# Patient Record
Sex: MALE | Race: WHITE | NOT HISPANIC OR LATINO | Employment: OTHER | URBAN - METROPOLITAN AREA
[De-identification: names, ages, dates, MRNs, and addresses within clinical notes are randomized per-mention and may not be internally consistent; named-entity substitution may affect disease eponyms.]

---

## 2018-10-02 ENCOUNTER — OFFICE VISIT (OUTPATIENT)
Dept: PODIATRY | Facility: CLINIC | Age: 68
End: 2018-10-02
Payer: MEDICARE

## 2018-10-02 VITALS — HEIGHT: 72 IN | WEIGHT: 315 LBS | BODY MASS INDEX: 42.66 KG/M2

## 2018-10-02 DIAGNOSIS — S80.812A ABRASION OF LEFT LOWER EXTREMITY, INITIAL ENCOUNTER: ICD-10-CM

## 2018-10-02 DIAGNOSIS — I73.9 PERIPHERAL VASCULAR DISEASE: ICD-10-CM

## 2018-10-02 DIAGNOSIS — R60.0 PERIPHERAL EDEMA: ICD-10-CM

## 2018-10-02 DIAGNOSIS — S90.811A ABRASION OF RIGHT FOOT EXCLUDING TOE, INITIAL ENCOUNTER: ICD-10-CM

## 2018-10-02 DIAGNOSIS — S91.209A AVULSION OF TOENAIL OF LEFT FOOT: Primary | ICD-10-CM

## 2018-10-02 DIAGNOSIS — E11.42 DIABETIC POLYNEUROPATHY ASSOCIATED WITH TYPE 2 DIABETES MELLITUS: ICD-10-CM

## 2018-10-02 DIAGNOSIS — I87.2 VENOUS INSUFFICIENCY: ICD-10-CM

## 2018-10-02 PROCEDURE — 99203 OFFICE O/P NEW LOW 30 MIN: CPT | Mod: PBBFAC,PN | Performed by: PODIATRIST

## 2018-10-02 PROCEDURE — 87070 CULTURE OTHR SPECIMN AEROBIC: CPT

## 2018-10-02 PROCEDURE — 87075 CULTR BACTERIA EXCEPT BLOOD: CPT

## 2018-10-02 PROCEDURE — 3045F PR MOST RECENT HEMOGLOBIN A1C LEVEL 7.0-9.0%: CPT | Mod: CPTII,,, | Performed by: PODIATRIST

## 2018-10-02 PROCEDURE — 99203 OFFICE O/P NEW LOW 30 MIN: CPT | Mod: S$PBB,,, | Performed by: PODIATRIST

## 2018-10-02 PROCEDURE — 1101F PT FALLS ASSESS-DOCD LE1/YR: CPT | Mod: CPTII,,, | Performed by: PODIATRIST

## 2018-10-02 PROCEDURE — 99999 PR PBB SHADOW E&M-NEW PATIENT-LVL III: CPT | Mod: PBBFAC,,, | Performed by: PODIATRIST

## 2018-10-02 NOTE — PROGRESS NOTES
Subjective:      Patient ID: Netsor Butler is a 68 y.o. male.    Chief Complaint: Nail Problem (toe nail came off left great toe)  Patient presents to clinic with the chief complaint of a nail avulsion of the Lt. Great toenail.  States this occurred yesterday following a fall with a significant amount of bleeding from the digit, prompting a visit to the ED.  Relates that hemostasis was ultimately achieved with application of a bandage and surgicel to the affected area.  Denies pain to the affected extremity with today's exam, however, he attributes this to neuropathy.  Also, relates having several additional abrasions that are also the sequela of yesterday's fall.  Is currently taking a course of Clinda that was prescribed following the ED visit.  Denies having N/V/F/C/D.  Denies any additional pedal complaints.      Past Medical History:   Diagnosis Date    Coronary artery disease     Deep vein thrombosis     Diabetes mellitus, type 2     Hyperlipidemia     Hypertension     Obesity     Osteoarthritis        Past Surgical History:   Procedure Laterality Date    APPENDECTOMY      CORONARY ARTERY BYPASS GRAFT         Family History   Problem Relation Age of Onset    Heart disease Mother     Heart disease Father        Social History     Socioeconomic History    Marital status:      Spouse name: None    Number of children: None    Years of education: None    Highest education level: None   Social Needs    Financial resource strain: None    Food insecurity - worry: None    Food insecurity - inability: None    Transportation needs - medical: None    Transportation needs - non-medical: None   Occupational History    None   Tobacco Use    Smoking status: Former Smoker     Last attempt to quit: 2005     Years since quittin.7    Smokeless tobacco: Never Used   Substance and Sexual Activity    Alcohol use: No    Drug use: No    Sexual activity: None   Other Topics Concern    None  "  Social History Narrative    None       Current Outpatient Medications   Medication Sig Dispense Refill    aspirin (ECOTRIN) 81 MG EC tablet Take 81 mg by mouth once daily.      atorvastatin (LIPITOR) 80 MG tablet Take 80 mg by mouth once daily.      canagliflozin (INVOKANA) 100 mg Tab Take 100 mg by mouth once daily.      clindamycin (CLEOCIN) 150 MG capsule Take 2 capsules (300 mg total) by mouth every 8 (eight) hours. for 7 days 42 capsule 0    docusate calcium (SURFAK) 240 mg capsule Take 240 mg by mouth 2 (two) times daily.      furosemide (LASIX) 40 MG tablet Take 80 mg by mouth 2 (two) times daily.      insulin regular 500 unit/mL Soln Inject into the skin 3 (three) times daily. Sliding scale per pump      losartan (COZAAR) 25 MG tablet Take 25 mg by mouth once daily.      multivitamin (THERAGRAN) tablet Take 1 tablet by mouth once daily.      rivaroxaban (XARELTO) 20 mg Tab Take 10 mg by mouth.      silver sulfADIAZINE 1% (SILVADENE) 1 % cream Apply topically 2 (two) times daily. To both leg ulcers 85 g 3    spironolactone (ALDACTONE) 25 MG tablet Take 25 mg by mouth 2 (two) times daily.      vitamin D 1000 units Tab Take 185 mg by mouth once daily.      nystatin (MYCOSTATIN) powder Apply topically 4 (four) times daily. 1 Bottle 1     No current facility-administered medications for this visit.        Review of patient's allergies indicates:   Allergen Reactions    Plague vaccine     Typhoid vaccines      "typhus vaccine"         Review of Systems   Constitution: Negative for chills and fever.   Cardiovascular: Positive for leg swelling.   Skin: Positive for color change and nail changes.   Musculoskeletal: Positive for joint swelling. Negative for joint pain, muscle cramps, muscle weakness and myalgias.   Neurological: Positive for numbness.   Psychiatric/Behavioral: Negative for altered mental status.           Objective:      Physical Exam   Constitutional: He is oriented to person, " place, and time. He appears well-developed and well-nourished. No distress.   Cardiovascular:   Pulses:       Dorsalis pedis pulses are 2+ on the right side, and 2+ on the left side.        Posterior tibial pulses are 1+ on the right side, and 1+ on the left side.   CFT is < 3 seconds bilateral.  Pedal hair growth is absent bilateral.  Varicosities noted bilateral.  1+ pitting edema noted to bilateral lower extremity.  Toes are warm to touch bilateral.     Musculoskeletal: He exhibits edema. He exhibits no tenderness.   Muscle strength 5/5 in all muscle groups bilateral.  No tenderness nor crepitation with ROM of foot/ankle joints bilateral.  No tenderness with palpation of bilateral foot and ankle.  Bilateral pes planus foot type.    Neurological: He is alert and oriented to person, place, and time. He has normal strength. A sensory deficit is present.   Protective sensation per [place]-Karthikeyan monofilament is absent bilateral.  Light touch is intact bilateral.   Skin: Skin is warm and dry. Capillary refill takes less than 2 seconds. Lesion noted. No abrasion, no bruising, no burn, no ecchymosis, no laceration, no petechiae and no rash noted. He is not diaphoretic. No erythema. No pallor.   Increased pedal turgor noted bilateral.  Toenails x 9 appear mycotic.  Exposed, granular nail bed noted to the Lt. Hallux.  No localized sign of infection noted.   Superficial abrasion noted to the dorsum of the Rt. Forefoot, Rt. Anterior lateral ankle, and Lt. Lateral lower leg.  Wound bases are down to dermis and granular.  Goldie wounds are edematous but devoid of erythema, fluctuance, purulence, and malodor.               Assessment:       Encounter Diagnoses   Name Primary?    Peripheral vascular disease     Diabetic polyneuropathy associated with type 2 diabetes mellitus     Venous insufficiency     Peripheral edema     Abrasion of right foot excluding toe, initial encounter     Abrasion of left lower extremity,  initial encounter     Avulsion of toenail of left foot Yes         Plan:       Nestor was seen today for nail problem.    Diagnoses and all orders for this visit:    Avulsion of toenail of left foot  -     Aerobic culture  -     Culture, Anaerobic    Peripheral vascular disease    Diabetic polyneuropathy associated with type 2 diabetes mellitus    Venous insufficiency    Peripheral edema    Abrasion of right foot excluding toe, initial encounter    Abrasion of left lower extremity, initial encounter      I counseled the patient on his conditions, their implications and medical management.    Advised to rest and elevate bilateral lower extremity to better manage fluid accumulation and to limit tension to the brittney wound to better facilitate wound healing.    Obtained cultures of the Lt. Hallux nail bed.    Advised to finish the current course of oral antibiotics until cultures have resulted.    Kaia was applied to the Lt. Hallux nail bed and all abrasion sites.   The Lt. Hallux nail bed was covered with 2x2 gauze and coban.  Instructed to keep this dressing CDI x 1 week.  Abrasion sites are to be covered with kaia and a mepilex border every three days.  Patient's wife feels competent enough to perform these dressing changes.    Advised to call if sites appear infected or in the presence of constitutional signs and symptoms.      RTC in 1 week for follow up.    Rey Torres DPM

## 2018-10-05 LAB — BACTERIA SPEC AEROBE CULT: NORMAL

## 2018-10-08 LAB — BACTERIA SPEC ANAEROBE CULT: NORMAL

## 2018-10-09 ENCOUNTER — OFFICE VISIT (OUTPATIENT)
Dept: PODIATRY | Facility: CLINIC | Age: 68
End: 2018-10-09
Payer: MEDICARE

## 2018-10-09 VITALS
DIASTOLIC BLOOD PRESSURE: 90 MMHG | HEIGHT: 71 IN | RESPIRATION RATE: 16 BRPM | HEART RATE: 81 BPM | WEIGHT: 315 LBS | BODY MASS INDEX: 44.1 KG/M2 | SYSTOLIC BLOOD PRESSURE: 196 MMHG

## 2018-10-09 DIAGNOSIS — R60.0 PERIPHERAL EDEMA: ICD-10-CM

## 2018-10-09 DIAGNOSIS — E11.42 DIABETIC POLYNEUROPATHY ASSOCIATED WITH TYPE 2 DIABETES MELLITUS: ICD-10-CM

## 2018-10-09 DIAGNOSIS — S91.209A AVULSION OF TOENAIL OF LEFT FOOT: Primary | ICD-10-CM

## 2018-10-09 DIAGNOSIS — I87.2 VENOUS INSUFFICIENCY: ICD-10-CM

## 2018-10-09 DIAGNOSIS — S80.812D ABRASION OF LEFT LOWER EXTREMITY, SUBSEQUENT ENCOUNTER: ICD-10-CM

## 2018-10-09 PROCEDURE — 3080F DIAST BP >= 90 MM HG: CPT | Mod: CPTII,,, | Performed by: PODIATRIST

## 2018-10-09 PROCEDURE — 99214 OFFICE O/P EST MOD 30 MIN: CPT | Mod: PBBFAC,PN | Performed by: PODIATRIST

## 2018-10-09 PROCEDURE — 3045F PR MOST RECENT HEMOGLOBIN A1C LEVEL 7.0-9.0%: CPT | Mod: CPTII,,, | Performed by: PODIATRIST

## 2018-10-09 PROCEDURE — 3288F FALL RISK ASSESSMENT DOCD: CPT | Mod: CPTII,,, | Performed by: PODIATRIST

## 2018-10-09 PROCEDURE — 99212 OFFICE O/P EST SF 10 MIN: CPT | Mod: S$PBB,,, | Performed by: PODIATRIST

## 2018-10-09 PROCEDURE — 1100F PTFALLS ASSESS-DOCD GE2>/YR: CPT | Mod: CPTII,,, | Performed by: PODIATRIST

## 2018-10-09 PROCEDURE — 3077F SYST BP >= 140 MM HG: CPT | Mod: CPTII,,, | Performed by: PODIATRIST

## 2018-10-09 PROCEDURE — 99999 PR PBB SHADOW E&M-EST. PATIENT-LVL IV: CPT | Mod: PBBFAC,,, | Performed by: PODIATRIST

## 2018-10-10 NOTE — PROGRESS NOTES
Subjective:      Patient ID: Nestor Butler is a 68 y.o. male.    Chief Complaint: Wound Care  Patient presents to clinic for a 1 week follow up for avulsion of the LT. Great toenail and multiple lower extremity wounds.  Denies pain to either LE with today's exam.  Has been applying kaia and a bandage to all wounds of the lower legs and has kept the prior toe football dressing clean, dry, and intact to the Lt. Hallux for one week.  Relates minimal ambulation to the affected extremity in a postoperative shoe.  Denies noticing signs of infection to the affected limbs with today's exam. Denies any additional pedal complaints.      Past Medical History:   Diagnosis Date    Coronary artery disease     Deep vein thrombosis     Diabetes mellitus, type 2     Hyperlipidemia     Hypertension     Obesity     Osteoarthritis        Past Surgical History:   Procedure Laterality Date    APPENDECTOMY      CORONARY ARTERY BYPASS GRAFT         Family History   Problem Relation Age of Onset    Heart disease Mother     Heart disease Father        Social History     Socioeconomic History    Marital status:      Spouse name: Not on file    Number of children: Not on file    Years of education: Not on file    Highest education level: Not on file   Social Needs    Financial resource strain: Not on file    Food insecurity - worry: Not on file    Food insecurity - inability: Not on file    Transportation needs - medical: Not on file    Transportation needs - non-medical: Not on file   Occupational History    Not on file   Tobacco Use    Smoking status: Former Smoker     Last attempt to quit: 2005     Years since quittin.7    Smokeless tobacco: Never Used   Substance and Sexual Activity    Alcohol use: No    Drug use: No    Sexual activity: Not on file   Other Topics Concern    Not on file   Social History Narrative    Not on file       Current Outpatient Medications   Medication Sig Dispense Refill  "   aspirin (ECOTRIN) 81 MG EC tablet Take 81 mg by mouth once daily.      atorvastatin (LIPITOR) 80 MG tablet Take 80 mg by mouth once daily.      canagliflozin (INVOKANA) 100 mg Tab Take 100 mg by mouth once daily.      docusate calcium (SURFAK) 240 mg capsule Take 240 mg by mouth 2 (two) times daily.      furosemide (LASIX) 40 MG tablet Take 80 mg by mouth 2 (two) times daily.      insulin regular 500 unit/mL Soln Inject into the skin 3 (three) times daily. Sliding scale per pump      losartan (COZAAR) 25 MG tablet Take 25 mg by mouth once daily.      multivitamin (THERAGRAN) tablet Take 1 tablet by mouth once daily.      rivaroxaban (XARELTO) 20 mg Tab Take 10 mg by mouth.      silver sulfADIAZINE 1% (SILVADENE) 1 % cream Apply topically 2 (two) times daily. To both leg ulcers 85 g 3    spironolactone (ALDACTONE) 25 MG tablet Take 25 mg by mouth 2 (two) times daily.      vitamin D 1000 units Tab Take 185 mg by mouth once daily.      nystatin (MYCOSTATIN) powder Apply topically 4 (four) times daily. 1 Bottle 1     No current facility-administered medications for this visit.        Review of patient's allergies indicates:   Allergen Reactions    Plague vaccine     Typhoid vaccines      "typhus vaccine"         Review of Systems   Constitution: Negative for chills and fever.   Cardiovascular: Positive for leg swelling.   Skin: Positive for color change and nail changes.   Musculoskeletal: Positive for joint swelling. Negative for joint pain, muscle cramps, muscle weakness and myalgias.   Neurological: Positive for numbness.   Psychiatric/Behavioral: Negative for altered mental status.           Objective:      Physical Exam   Constitutional: He is oriented to person, place, and time. He appears well-developed and well-nourished. No distress.   Cardiovascular:   Pulses:       Dorsalis pedis pulses are 2+ on the right side, and 2+ on the left side.        Posterior tibial pulses are 1+ on the right " side, and 1+ on the left side.   CFT is < 3 seconds bilateral.  Pedal hair growth is absent bilateral.  Varicosities noted bilateral.  1+ pitting edema noted to bilateral lower extremity.  Toes are warm to touch bilateral.     Musculoskeletal: He exhibits edema. He exhibits no tenderness.   Muscle strength 5/5 in all muscle groups bilateral.  No tenderness nor crepitation with ROM of foot/ankle joints bilateral.  No tenderness with palpation of bilateral foot and ankle.  Bilateral pes planus foot type.    Neurological: He is alert and oriented to person, place, and time. He has normal strength. A sensory deficit is present.   Protective sensation per [place]-Karthikeyan monofilament is absent bilateral.  Light touch is intact bilateral.   Skin: Skin is warm and dry. Capillary refill takes less than 2 seconds. Lesion noted. No abrasion, no bruising, no burn, no ecchymosis, no laceration, no petechiae and no rash noted. He is not diaphoretic. No erythema. No pallor.   Increased pedal turgor noted bilateral.  Toenails x 9 appear mycotic.  Exposed, granular nail bed noted to the Lt. Hallux interspersed with areas of fragile epithelium.  No localized sign of infection noted.   Superficial abrasion noted to the Lt. Lateral lower leg.  Wound base is down to dermis and granular.  Goldie wound appears edematous but devoid of erythema, fluctuance, purulence, and malodor.   Epithelialization of former wounds of the Rt. Lower leg and foot.             Assessment:       Encounter Diagnoses   Name Primary?    Avulsion of toenail of left foot Yes    Abrasion of left lower extremity, subsequent encounter     Venous insufficiency     Peripheral edema     Diabetic polyneuropathy associated with type 2 diabetes mellitus          Plan:       Nestor was seen today for wound care.    Diagnoses and all orders for this visit:    Avulsion of toenail of left foot    Abrasion of left lower extremity, subsequent encounter    Venous  insufficiency    Peripheral edema    Diabetic polyneuropathy associated with type 2 diabetes mellitus      I counseled the patient on his conditions, their implications and medical management.    Advised to rest and elevate bilateral lower extremity to better manage fluid accumulation and to limit tension to the brittney wound to better facilitate wound healing.    Saida was applied to the Lt. Hallux nail bed and the Lt. Lower leg abrasion.  Both sites are to be covered with saida and a mepilex border every three days.     Advised to call if sites appear infected or in the presence of constitutional signs and symptoms.  Also, to call if the Lt. Nail bed wound has failed to fully epithelialize in two weeks.    RTC in 3 months for routine diabetic follow up.    Rey Torres DPM

## 2019-01-04 ENCOUNTER — OFFICE VISIT (OUTPATIENT)
Dept: UROLOGY | Facility: CLINIC | Age: 69
End: 2019-01-04
Payer: MEDICARE

## 2019-01-04 VITALS
WEIGHT: 315 LBS | SYSTOLIC BLOOD PRESSURE: 141 MMHG | HEART RATE: 75 BPM | BODY MASS INDEX: 44.1 KG/M2 | DIASTOLIC BLOOD PRESSURE: 78 MMHG | HEIGHT: 71 IN

## 2019-01-04 DIAGNOSIS — R35.0 INCREASED URINARY FREQUENCY: Primary | ICD-10-CM

## 2019-01-04 DIAGNOSIS — N48.83 ACQUIRED BURIED PENIS: ICD-10-CM

## 2019-01-04 DIAGNOSIS — N39.41 URGE INCONTINENCE: ICD-10-CM

## 2019-01-04 DIAGNOSIS — N47.1 ACQUIRED PHIMOSIS: ICD-10-CM

## 2019-01-04 DIAGNOSIS — R39.15 URINARY URGENCY: ICD-10-CM

## 2019-01-04 LAB
BILIRUB SERPL-MCNC: ABNORMAL MG/DL
BLOOD URINE, POC: ABNORMAL
COLOR, POC UA: YELLOW
GLUCOSE UR QL STRIP: 500
KETONES UR QL STRIP: ABNORMAL
LEUKOCYTE ESTERASE URINE, POC: ABNORMAL
NITRITE, POC UA: ABNORMAL
PH, POC UA: 5.5
PROTEIN, POC: 30
SPECIFIC GRAVITY, POC UA: 1.01
UROBILINOGEN, POC UA: ABNORMAL

## 2019-01-04 PROCEDURE — 51701 PR INSERTION OF NON-INDWELLING BLADDER CATHETERIZATION FOR RESIDUAL UR: ICD-10-PCS | Mod: S$GLB,,, | Performed by: UROLOGY

## 2019-01-04 PROCEDURE — 99999 PR PBB SHADOW E&M-EST. PATIENT-LVL III: ICD-10-PCS | Mod: PBBFAC,,, | Performed by: UROLOGY

## 2019-01-04 PROCEDURE — 87086 URINE CULTURE/COLONY COUNT: CPT

## 2019-01-04 PROCEDURE — 81002 URINALYSIS NONAUTO W/O SCOPE: CPT | Mod: S$GLB,,, | Performed by: UROLOGY

## 2019-01-04 PROCEDURE — 87077 CULTURE AEROBIC IDENTIFY: CPT

## 2019-01-04 PROCEDURE — 81002 POCT URINE DIPSTICK WITHOUT MICROSCOPE: ICD-10-PCS | Mod: S$GLB,,, | Performed by: UROLOGY

## 2019-01-04 PROCEDURE — 99204 OFFICE O/P NEW MOD 45 MIN: CPT | Mod: 25,S$GLB,, | Performed by: UROLOGY

## 2019-01-04 PROCEDURE — 3077F PR MOST RECENT SYSTOLIC BLOOD PRESSURE >= 140 MM HG: ICD-10-PCS | Mod: CPTII,S$GLB,, | Performed by: UROLOGY

## 2019-01-04 PROCEDURE — 3077F SYST BP >= 140 MM HG: CPT | Mod: CPTII,S$GLB,, | Performed by: UROLOGY

## 2019-01-04 PROCEDURE — 1101F PR PT FALLS ASSESS DOC 0-1 FALLS W/OUT INJ PAST YR: ICD-10-PCS | Mod: CPTII,S$GLB,, | Performed by: UROLOGY

## 2019-01-04 PROCEDURE — 99499 RISK ADDL DX/OHS AUDIT: ICD-10-PCS | Mod: S$GLB,,, | Performed by: UROLOGY

## 2019-01-04 PROCEDURE — 99999 PR PBB SHADOW E&M-EST. PATIENT-LVL III: CPT | Mod: PBBFAC,,, | Performed by: UROLOGY

## 2019-01-04 PROCEDURE — 51701 INSERT BLADDER CATHETER: CPT | Mod: S$GLB,,, | Performed by: UROLOGY

## 2019-01-04 PROCEDURE — 3078F PR MOST RECENT DIASTOLIC BLOOD PRESSURE < 80 MM HG: ICD-10-PCS | Mod: CPTII,S$GLB,, | Performed by: UROLOGY

## 2019-01-04 PROCEDURE — 1101F PT FALLS ASSESS-DOCD LE1/YR: CPT | Mod: CPTII,S$GLB,, | Performed by: UROLOGY

## 2019-01-04 PROCEDURE — 99499 UNLISTED E&M SERVICE: CPT | Mod: S$GLB,,, | Performed by: UROLOGY

## 2019-01-04 PROCEDURE — 87088 URINE BACTERIA CULTURE: CPT

## 2019-01-04 PROCEDURE — 87186 SC STD MICRODIL/AGAR DIL: CPT

## 2019-01-04 PROCEDURE — 3078F DIAST BP <80 MM HG: CPT | Mod: CPTII,S$GLB,, | Performed by: UROLOGY

## 2019-01-04 PROCEDURE — 99204 PR OFFICE/OUTPT VISIT, NEW, LEVL IV, 45-59 MIN: ICD-10-PCS | Mod: 25,S$GLB,, | Performed by: UROLOGY

## 2019-01-04 RX ORDER — OXYBUTYNIN CHLORIDE 5 MG/1
5 TABLET, EXTENDED RELEASE ORAL DAILY
Qty: 30 TABLET | Refills: 11 | Status: SHIPPED | OUTPATIENT
Start: 2019-01-04 | End: 2020-01-04

## 2019-01-04 RX ORDER — CIPROFLOXACIN 500 MG/1
500 TABLET ORAL 2 TIMES DAILY
Qty: 20 TABLET | Refills: 0 | Status: SHIPPED | OUTPATIENT
Start: 2019-01-04 | End: 2019-01-14

## 2019-01-04 NOTE — PROGRESS NOTES
Patient, Nestor Butler (MRN #[id number]), presented with a recorded BMI of 56.63 kg/m^2 consistent with the definition of morbid obesity (ICD-10 E66.01). The patient's morbid obesity was monitored, evaluated, addressed and/or treated. This addendum to the medical record is made on 01/04/2019.

## 2019-01-04 NOTE — PROGRESS NOTES
Subjective:       Patient ID: Nestor Butler is a 68 y.o. male.    Chief Complaint: Consult (urinary incontience)    HPI     68 year old morbidly obese male with BMI >56 complains of urinary frequency and urgency with urge incontinence and hesitancy.   He says his flow is ok.  He has mild dysuria.  No gross hematuria.   Seen in wheelchair and has difficulty transferring to low exam table.  He takes Aldactone and Lasix.   He takes no BPH medication.    Cath UA:  Approx 200 ml.    Urine dipstick shows positive for 1+blood and positive for trace leukocytes.  Micro exam: 30-50 WBC's per HPF and 2+ bacteria.    Past Medical History:   Diagnosis Date    Coronary artery disease     Deep vein thrombosis     Diabetes mellitus, type 2     Hyperlipidemia     Hypertension     Obesity     Osteoarthritis      Past Surgical History:   Procedure Laterality Date    APPENDECTOMY      CORONARY ARTERY BYPASS GRAFT           Current Outpatient Medications:     aspirin (ECOTRIN) 81 MG EC tablet, Take 81 mg by mouth once daily., Disp: , Rfl:     atorvastatin (LIPITOR) 80 MG tablet, Take 80 mg by mouth once daily., Disp: , Rfl:     canagliflozin (INVOKANA) 100 mg Tab, Take 100 mg by mouth once daily., Disp: , Rfl:     docusate calcium (SURFAK) 240 mg capsule, Take 240 mg by mouth 2 (two) times daily., Disp: , Rfl:     furosemide (LASIX) 40 MG tablet, Take 80 mg by mouth 2 (two) times daily., Disp: , Rfl:     insulin regular 500 unit/mL Soln, Inject into the skin 3 (three) times daily. Sliding scale per pump, Disp: , Rfl:     losartan (COZAAR) 25 MG tablet, Take 25 mg by mouth once daily., Disp: , Rfl:     multivitamin (THERAGRAN) tablet, Take 1 tablet by mouth once daily., Disp: , Rfl:     rivaroxaban (XARELTO) 20 mg Tab, Take 10 mg by mouth., Disp: , Rfl:     silver sulfADIAZINE 1% (SILVADENE) 1 % cream, Apply topically 2 (two) times daily. To both leg ulcers, Disp: 85 g, Rfl: 3    spironolactone (ALDACTONE) 25 MG tablet,  Take 25 mg by mouth 2 (two) times daily., Disp: , Rfl:     vitamin D 1000 units Tab, Take 185 mg by mouth once daily., Disp: , Rfl:     ciprofloxacin HCl (CIPRO) 500 MG tablet, Take 1 tablet (500 mg total) by mouth 2 (two) times daily. for 10 days, Disp: 20 tablet, Rfl: 0    nystatin (MYCOSTATIN) powder, Apply topically 4 (four) times daily., Disp: 1 Bottle, Rfl: 1    oxybutynin (DITROPAN-XL) 5 MG TR24, Take 1 tablet (5 mg total) by mouth once daily., Disp: 30 tablet, Rfl: 11      Review of Systems   Constitutional: Negative for fever.   Eyes: Negative for visual disturbance.   Respiratory: Negative for shortness of breath.    Cardiovascular: Negative for chest pain.   Gastrointestinal: Negative for nausea.   Genitourinary: Positive for dysuria, frequency and urgency. Negative for hematuria.   Musculoskeletal: Positive for gait problem.   Skin: Negative for rash.   Neurological: Negative for seizures.   Psychiatric/Behavioral: Negative for confusion.       Objective:      Physical Exam   Constitutional: He is oriented to person, place, and time. He appears well-developed and well-nourished.   HENT:   Head: Normocephalic and atraumatic.   Eyes: Conjunctivae are normal.   Cardiovascular: Normal rate.   Pulmonary/Chest: Effort normal.   Abdominal: Soft. He exhibits no distension. There is no tenderness.   Genitourinary:   Genitourinary Comments: Penis buried with phimosis.  Testes normal   Musculoskeletal: Normal range of motion. He exhibits edema.   Neurological: He is alert and oriented to person, place, and time.   Skin: Skin is warm and dry. No rash noted.   Psychiatric: He has a normal mood and affect.   Vitals reviewed.        Cath UA (200 ml)    Assessment:       1. Increased urinary frequency    2. Urinary urgency    3. Urge incontinence    4. Acquired phimosis    5. Acquired buried penis        Plan:       Increased urinary frequency  -     POCT urine dipstick without microscope  -     Urine  culture    Urinary urgency  -     POCT urine dipstick without microscope    Urge incontinence  -     POCT urine dipstick without microscope    Acquired phimosis  -     POCT urine dipstick without microscope    Acquired buried penis  -     POCT urine dipstick without microscope    Other orders  -     ciprofloxacin HCl (CIPRO) 500 MG tablet; Take 1 tablet (500 mg total) by mouth 2 (two) times daily. for 10 days  Dispense: 20 tablet; Refill: 0  -     oxybutynin (DITROPAN-XL) 5 MG TR24; Take 1 tablet (5 mg total) by mouth once daily.  Dispense: 30 tablet; Refill: 11

## 2019-01-07 ENCOUNTER — TELEPHONE (OUTPATIENT)
Dept: UROLOGY | Facility: CLINIC | Age: 69
End: 2019-01-07

## 2019-01-07 LAB — BACTERIA UR CULT: NORMAL

## 2019-01-07 NOTE — TELEPHONE ENCOUNTER
----- Message from Kp Lockhart sent at 1/7/2019  9:52 AM CST -----  Contact: Wife, Naz Childs want to speak with a nurse regarding test results please call back at [phone]

## 2019-01-08 NOTE — TELEPHONE ENCOUNTER
----- Message from Araceli Ferguson sent at 1/8/2019 12:03 PM CST -----  Contact: Naz-wife  Type: Needs Medical Advice    Who Called:  Naz Grullon Call Back Number: [phone] (home)   Additional Information: calling in regards to test results. Would like to be called today. Thanks.

## 2019-01-09 ENCOUNTER — TELEPHONE (OUTPATIENT)
Dept: UROLOGY | Facility: CLINIC | Age: 69
End: 2019-01-09

## 2019-01-09 NOTE — TELEPHONE ENCOUNTER
----- Message from Barrington Bryan sent at 1/9/2019 10:38 AM CST -----  Contact: pt wife Naz  Type:  Test Results    Who Called:  Naz  Name of Test (Lab/Mammo/Etc):  Urine culture  Date of Test:  1.4.19  Ordering Provider:  Dr Quispe  Where the test was performed:  [facility]  Best Call Back Number:  [phone]  Additional Information:

## 2019-01-09 NOTE — TELEPHONE ENCOUNTER
Informed pt's wife of results and antibiotics ordered.  Pt's wife still concerned with patient's incontinence. Advised UTI can sometimes cause that and to wait for 5 days after finishing antibiotics and we will reevaluate incontinence.

## 2019-01-21 ENCOUNTER — TELEPHONE (OUTPATIENT)
Dept: PODIATRY | Facility: CLINIC | Age: 69
End: 2019-01-21

## 2019-01-21 ENCOUNTER — OFFICE VISIT (OUTPATIENT)
Dept: UROLOGY | Facility: CLINIC | Age: 69
End: 2019-01-21
Payer: MEDICARE

## 2019-01-21 VITALS — BODY MASS INDEX: 44.1 KG/M2 | HEIGHT: 71 IN | WEIGHT: 315 LBS

## 2019-01-21 DIAGNOSIS — N30.90 CYSTITIS WITHOUT HEMATURIA: Primary | ICD-10-CM

## 2019-01-21 PROCEDURE — 99999 PR PBB SHADOW E&M-EST. PATIENT-LVL II: ICD-10-PCS | Mod: PBBFAC,,, | Performed by: UROLOGY

## 2019-01-21 PROCEDURE — 99213 PR OFFICE/OUTPT VISIT, EST, LEVL III, 20-29 MIN: ICD-10-PCS | Mod: S$GLB,,, | Performed by: UROLOGY

## 2019-01-21 PROCEDURE — 1101F PR PT FALLS ASSESS DOC 0-1 FALLS W/OUT INJ PAST YR: ICD-10-PCS | Mod: CPTII,S$GLB,, | Performed by: UROLOGY

## 2019-01-21 PROCEDURE — 99213 OFFICE O/P EST LOW 20 MIN: CPT | Mod: S$GLB,,, | Performed by: UROLOGY

## 2019-01-21 PROCEDURE — 1101F PT FALLS ASSESS-DOCD LE1/YR: CPT | Mod: CPTII,S$GLB,, | Performed by: UROLOGY

## 2019-01-21 PROCEDURE — 99999 PR PBB SHADOW E&M-EST. PATIENT-LVL II: CPT | Mod: PBBFAC,,, | Performed by: UROLOGY

## 2019-01-21 NOTE — PROGRESS NOTES
Subjective:       Patient ID: Nestor Butler is a 68 y.o. male.    Chief Complaint: Follow-up    HPI     68 year old with recent UTI.  Urine culture positive for Proteus.  He completed a 10 day course of Cipro.  He says his urinary symptoms have returned to baseline.  He denies incontinence and urgency is decreased.  Dysuria resolved.  [facility] and has [facility] tomorrow.  He will gets PSA    Review of Systems   Constitutional: Negative for fever.   Genitourinary: Negative for dysuria and hematuria.       Objective:      Physical Exam   Constitutional: He is oriented to person, place, and time. He appears well-developed and well-nourished.   Pulmonary/Chest: Effort normal.   Neurological: He is alert and oriented to person, place, and time.   Skin: No rash noted.   Psychiatric: He has a normal mood and affect.   Vitals reviewed.      Assessment:       1. Cystitis without hematuria        Plan:       Cystitis without hematuria

## 2019-01-21 NOTE — TELEPHONE ENCOUNTER
----- Message from Araceli Ferguson sent at 1/21/2019  9:09 AM CST -----  [phone]  Type: Needs Medical Advice    Who Called:  Suzanne  Best Call Back Number:  [phone]  Additional Information: Need new orders for wound care

## 2019-01-21 NOTE — TELEPHONE ENCOUNTER
Called and spoke with Danita Let her know we had not seen the patient since Oct 2019, and the it looked like he was being seen by Wound care at [facility]. She stated she would give them a call about orders.

## 2019-01-28 ENCOUNTER — TELEPHONE (OUTPATIENT)
Dept: PODIATRY | Facility: CLINIC | Age: 69
End: 2019-01-28

## 2019-01-28 NOTE — TELEPHONE ENCOUNTER
----- Message from Vida Ren sent at 1/28/2019  9:34 AM CST -----  Contact: Danita with [facility]  Danita with [facility] is calling for wound care orders. Please fax to [phone]. Please call Brittney at [phone] if any questions. Thanks!

## 2019-01-28 NOTE — TELEPHONE ENCOUNTER
Spoke with Brittney let her know what DR. Torres said in his message. She voiced all understanding.

## 2019-02-18 ENCOUNTER — TELEPHONE (OUTPATIENT)
Dept: PODIATRY | Facility: CLINIC | Age: 69
End: 2019-02-18

## 2019-02-18 ENCOUNTER — TELEPHONE (OUTPATIENT)
Dept: UROLOGY | Facility: CLINIC | Age: 69
End: 2019-02-18

## 2019-02-18 NOTE — TELEPHONE ENCOUNTER
----- Message from Gabriela Sánchez sent at 2/18/2019 11:20 AM CST -----  Type: Needs Medical Advice    Who Called: SusyBerenice Dahl  nurse    Best Call Back Number: [phone]  Additional Information: Patient has a blister on top of left foot, 8cm x 9cm, full of fluid. Please contact to advise

## 2019-02-18 NOTE — TELEPHONE ENCOUNTER
Spoke with  and he advised for patient to put Iodine and non-adherent gauze and wrap it with kerlix gauze until he sees him at the wound care center on Friday. Spoke with Susy and advised of wound care instructions.

## 2019-02-18 NOTE — TELEPHONE ENCOUNTER
----- Message from Gabriela Sánchez sent at 2/18/2019 10:25 AM CST -----  Type:  Sooner Apoointment Request    Caller is requesting a sooner appointment.  Caller declined first available appointment listed below.  Caller will not accept being placed on the waitlist and is requesting a message be sent to doctor.    Name of Caller: wife- Naz Butler  When is the first available appointment?  3/11/19  Symptoms:  Follow up/issue incontinence  Best Call Back Number: [phone]    Additional Information:

## 2019-02-19 ENCOUNTER — TELEPHONE (OUTPATIENT)
Dept: UROLOGY | Facility: CLINIC | Age: 69
End: 2019-02-19

## 2019-02-19 DIAGNOSIS — N30.00 ACUTE CYSTITIS WITHOUT HEMATURIA: Primary | ICD-10-CM

## 2019-02-19 NOTE — TELEPHONE ENCOUNTER
----- Message from Jose Angel Ruffin sent at 2/19/2019  1:28 PM CST -----  Contact: Nazjeanmarie Butler - Wife  Type:  Patient Returning Call    Who Called:  Naz Bravo Left Message for Patient:  Lynn Castillo  Does the patient know what this is regarding?:  No - never received call  Best Call Back Number:  [phone]  Additional Information:  NA

## 2019-02-25 DIAGNOSIS — L08.9 FOOT INFECTION: Primary | ICD-10-CM

## 2019-02-25 RX ORDER — SULFAMETHOXAZOLE AND TRIMETHOPRIM 800; 160 MG/1; MG/1
1 TABLET ORAL 2 TIMES DAILY
Qty: 14 TABLET | Refills: 0 | Status: ON HOLD | OUTPATIENT
Start: 2019-02-25 | End: 2019-04-04 | Stop reason: ALTCHOICE

## 2019-03-27 ENCOUNTER — TELEPHONE (OUTPATIENT)
Dept: ADMINISTRATIVE | Facility: CLINIC | Age: 69
End: 2019-03-27

## 2019-03-27 NOTE — TELEPHONE ENCOUNTER
Home Health SOC 02/18/2019 - 04/18/2019 with [facility]) - Dr. Rey Torres.  services.

## 2019-04-03 ENCOUNTER — TELEPHONE (OUTPATIENT)
Dept: UROLOGY | Facility: CLINIC | Age: 69
End: 2019-04-03

## 2019-04-03 PROBLEM — T14.8XXA MULTIPLE WOUNDS OF SKIN: Status: ACTIVE | Noted: 2019-04-03

## 2019-04-03 PROBLEM — I25.10 CAD (CORONARY ARTERY DISEASE): Status: ACTIVE | Noted: 2019-04-03

## 2019-04-03 PROBLEM — R32 URINARY INCONTINENCE: Status: ACTIVE | Noted: 2019-04-03

## 2019-04-03 PROBLEM — R29.6 RECURRENT FALLS: Status: ACTIVE | Noted: 2019-04-03

## 2019-04-03 PROBLEM — I73.9 PAD (PERIPHERAL ARTERY DISEASE): Status: ACTIVE | Noted: 2019-04-03

## 2019-04-03 PROBLEM — E11.9 TYPE 2 DIABETES MELLITUS: Status: ACTIVE | Noted: 2019-04-03

## 2019-04-03 PROBLEM — E87.1 HYPONATREMIA: Status: ACTIVE | Noted: 2019-04-03

## 2019-04-03 PROBLEM — E78.5 HLD (HYPERLIPIDEMIA): Status: ACTIVE | Noted: 2019-04-03

## 2019-04-03 PROBLEM — N17.9 AKI (ACUTE KIDNEY INJURY): Status: ACTIVE | Noted: 2019-04-03

## 2019-04-03 PROBLEM — I10 HTN (HYPERTENSION): Status: ACTIVE | Noted: 2019-04-03

## 2019-04-03 NOTE — TELEPHONE ENCOUNTER
Pt's wife stated he is going to ED for falling, dizziness, weakness. Advised I will notify Dr. Quispe

## 2019-04-03 NOTE — TELEPHONE ENCOUNTER
----- Message from Xiao Lima sent at 4/3/2019 12:02 PM CDT -----  Contact: Naz Butler (Spouse)  Naz Butler (Spouse) calling wantig to get the pt in as soon as possible ,his problem is getting worse...[phone]

## 2019-05-09 ENCOUNTER — OFFICE VISIT (OUTPATIENT)
Dept: UROLOGY | Facility: CLINIC | Age: 69
End: 2019-05-09
Payer: MEDICARE

## 2019-05-09 DIAGNOSIS — N39.41 URGE INCONTINENCE: ICD-10-CM

## 2019-05-09 DIAGNOSIS — R35.0 INCREASED URINARY FREQUENCY: Primary | ICD-10-CM

## 2019-05-09 DIAGNOSIS — R39.15 URINARY URGENCY: ICD-10-CM

## 2019-05-09 PROCEDURE — 1101F PR PT FALLS ASSESS DOC 0-1 FALLS W/OUT INJ PAST YR: ICD-10-PCS | Mod: CPTII,S$GLB,, | Performed by: UROLOGY

## 2019-05-09 PROCEDURE — 99999 PR PBB SHADOW E&M-EST. PATIENT-LVL II: CPT | Mod: PBBFAC,,, | Performed by: UROLOGY

## 2019-05-09 PROCEDURE — 99213 OFFICE O/P EST LOW 20 MIN: CPT | Mod: S$GLB,,, | Performed by: UROLOGY

## 2019-05-09 PROCEDURE — 99213 PR OFFICE/OUTPT VISIT, EST, LEVL III, 20-29 MIN: ICD-10-PCS | Mod: S$GLB,,, | Performed by: UROLOGY

## 2019-05-09 PROCEDURE — 99999 PR PBB SHADOW E&M-EST. PATIENT-LVL II: ICD-10-PCS | Mod: PBBFAC,,, | Performed by: UROLOGY

## 2019-05-09 PROCEDURE — 1101F PT FALLS ASSESS-DOCD LE1/YR: CPT | Mod: CPTII,S$GLB,, | Performed by: UROLOGY

## 2019-05-09 RX ORDER — AMMONIUM LACTATE 12 G/100G
CREAM TOPICAL
Refills: 4 | COMMUNITY
Start: 2019-04-29

## 2019-05-09 NOTE — PROGRESS NOTES
Subjective:       Patient ID: Nestor Butler is a 69 y.o. male.    Chief Complaint: Follow-up    HPI     69-year-old male with a history of coronary artery disease, diabetes mellitus, hypertension, hyperlipidemia, and multiple skin wounds.   He was recently hospitalized for progressive weakness.  He has been in rehab facility for the last several week.  He has worsening urinary urgency and urge incontinence.  He had similar symptoms 4 months ago and was treated for a UTI and urgency resolved.  He was given oxybutynin 5 mg and doesn't feel there is significant improvement.  He has urgency and urge incontinence.  His wife is most bothered as the patient is unable to care for himself due to weakness and obesity    Review of Systems   Constitutional: Negative for fever.   Genitourinary: Negative for dysuria and hematuria.       Objective:      Physical Exam   Constitutional: He is oriented to person, place, and time. He appears well-developed and well-nourished.   Pulmonary/Chest: Effort normal.   Neurological: He is alert and oriented to person, place, and time.   Skin: No rash noted.   Psychiatric: He has a normal mood and affect.   Vitals reviewed.      Assessment:       1. Increased urinary frequency    2. Urinary urgency    3. Urge incontinence        Plan:       Increased urinary frequency    Urinary urgency    Urge incontinence    Other orders  -     mirabegron (MYRBETRIQ) 50 mg Tb24; Take 1 tablet (50 mg total) by mouth once daily.  Dispense: 30 tablet; Refill: 11

## 2019-05-13 ENCOUNTER — TELEPHONE (OUTPATIENT)
Dept: UROLOGY | Facility: CLINIC | Age: 69
End: 2019-05-13

## 2019-05-13 NOTE — TELEPHONE ENCOUNTER
----- Message from Chantal Owens sent at 5/13/2019 11:25 AM CDT -----  Type: Needs Medical Advice    Who Called: Vinod with [facility] Call Back Number: [phone]  Additional Information: Has questions regarding patient meds from 5/9/19 the 2 Rx's in question are mirabegron (MYRBETRIQ) 50 mg Tb24 and oxybutynin (DITROPAN-XL) 5 MG TR24